# Patient Record
Sex: MALE | Race: WHITE | NOT HISPANIC OR LATINO | Employment: OTHER | URBAN - METROPOLITAN AREA
[De-identification: names, ages, dates, MRNs, and addresses within clinical notes are randomized per-mention and may not be internally consistent; named-entity substitution may affect disease eponyms.]

---

## 2023-12-04 ENCOUNTER — HOSPITAL ENCOUNTER (OUTPATIENT)
Dept: RADIOLOGY | Facility: HOSPITAL | Age: 68
Discharge: HOME | End: 2023-12-04
Attending: FAMILY MEDICINE

## 2023-12-04 DIAGNOSIS — S09.90XA HEAD INJURY: ICD-10-CM

## 2023-12-04 DIAGNOSIS — R05.9 COUGH: ICD-10-CM

## 2023-12-04 PROCEDURE — 71046 X-RAY EXAM CHEST 2 VIEWS: CPT | Mod: TC

## 2023-12-04 PROCEDURE — 70450 CT HEAD/BRAIN W/O DYE: CPT | Mod: TC

## 2024-06-01 ENCOUNTER — HOSPITAL ENCOUNTER (EMERGENCY)
Facility: HOSPITAL | Age: 69
Discharge: HOME | End: 2024-06-02
Attending: GENERAL ACUTE CARE HOSPITAL

## 2024-06-01 DIAGNOSIS — E87.6 HYPOKALEMIA: ICD-10-CM

## 2024-06-01 DIAGNOSIS — F10.220 ALCOHOL DEPENDENCE WITH UNCOMPLICATED INTOXICATION: ICD-10-CM

## 2024-06-01 DIAGNOSIS — S39.012A LUMBAR STRAIN, INITIAL ENCOUNTER: Primary | ICD-10-CM

## 2024-06-01 LAB
BASOPHILS # BLD AUTO: 0.01 X10(3)/MCL
BASOPHILS NFR BLD AUTO: 0.1 %
EOSINOPHIL # BLD AUTO: 0 X10(3)/MCL (ref 0–0.9)
EOSINOPHIL NFR BLD AUTO: 0 %
ERYTHROCYTE [DISTWIDTH] IN BLOOD BY AUTOMATED COUNT: 12 % (ref 11.5–17)
HCT VFR BLD AUTO: 44.8 % (ref 42–52)
HGB BLD-MCNC: 16.1 G/DL (ref 14–18)
IMM GRANULOCYTES # BLD AUTO: 0.07 X10(3)/MCL (ref 0–0.04)
IMM GRANULOCYTES NFR BLD AUTO: 0.6 %
LYMPHOCYTES # BLD AUTO: 0.62 X10(3)/MCL (ref 0.6–4.6)
LYMPHOCYTES NFR BLD AUTO: 5 %
MCH RBC QN AUTO: 34.3 PG (ref 27–31)
MCHC RBC AUTO-ENTMCNC: 35.9 G/DL (ref 33–36)
MCV RBC AUTO: 95.3 FL (ref 80–94)
MONOCYTES # BLD AUTO: 0.69 X10(3)/MCL (ref 0.1–1.3)
MONOCYTES NFR BLD AUTO: 5.6 %
NEUTROPHILS # BLD AUTO: 10.94 X10(3)/MCL (ref 2.1–9.2)
NEUTROPHILS NFR BLD AUTO: 88.7 %
PLATELET # BLD AUTO: 241 X10(3)/MCL (ref 130–400)
PMV BLD AUTO: 9.4 FL (ref 7.4–10.4)
RBC # BLD AUTO: 4.7 X10(6)/MCL (ref 4.7–6.1)
WBC # SPEC AUTO: 12.33 X10(3)/MCL (ref 4.5–11.5)

## 2024-06-01 PROCEDURE — 96372 THER/PROPH/DIAG INJ SC/IM: CPT | Performed by: GENERAL ACUTE CARE HOSPITAL

## 2024-06-01 PROCEDURE — 82077 ASSAY SPEC XCP UR&BREATH IA: CPT | Performed by: GENERAL ACUTE CARE HOSPITAL

## 2024-06-01 PROCEDURE — 63600175 PHARM REV CODE 636 W HCPCS: Performed by: GENERAL ACUTE CARE HOSPITAL

## 2024-06-01 PROCEDURE — 99284 EMERGENCY DEPT VISIT MOD MDM: CPT | Mod: 25

## 2024-06-01 PROCEDURE — 80053 COMPREHEN METABOLIC PANEL: CPT | Performed by: GENERAL ACUTE CARE HOSPITAL

## 2024-06-01 PROCEDURE — 85025 COMPLETE CBC W/AUTO DIFF WBC: CPT | Performed by: GENERAL ACUTE CARE HOSPITAL

## 2024-06-01 PROCEDURE — 96360 HYDRATION IV INFUSION INIT: CPT

## 2024-06-01 PROCEDURE — 25000003 PHARM REV CODE 250: Performed by: GENERAL ACUTE CARE HOSPITAL

## 2024-06-01 RX ORDER — KETOROLAC TROMETHAMINE 30 MG/ML
30 INJECTION, SOLUTION INTRAMUSCULAR; INTRAVENOUS
Status: COMPLETED | OUTPATIENT
Start: 2024-06-01 | End: 2024-06-01

## 2024-06-01 RX ORDER — CYCLOBENZAPRINE HCL 10 MG
10 TABLET ORAL
Status: COMPLETED | OUTPATIENT
Start: 2024-06-01 | End: 2024-06-01

## 2024-06-01 RX ADMIN — CYCLOBENZAPRINE 10 MG: 10 TABLET, FILM COATED ORAL at 10:06

## 2024-06-01 RX ADMIN — KETOROLAC TROMETHAMINE 30 MG: 30 INJECTION, SOLUTION INTRAMUSCULAR; INTRAVENOUS at 10:06

## 2024-06-02 VITALS
BODY MASS INDEX: 27.06 KG/M2 | HEIGHT: 70 IN | HEART RATE: 95 BPM | RESPIRATION RATE: 19 BRPM | WEIGHT: 189 LBS | OXYGEN SATURATION: 98 % | SYSTOLIC BLOOD PRESSURE: 131 MMHG | TEMPERATURE: 97 F | DIASTOLIC BLOOD PRESSURE: 62 MMHG

## 2024-06-02 LAB
ALBUMIN SERPL-MCNC: 3.2 G/DL (ref 3.4–4.8)
ALBUMIN/GLOB SERPL: 0.7 RATIO (ref 1.1–2)
ALP SERPL-CCNC: 88 UNIT/L (ref 40–150)
ALT SERPL-CCNC: 27 UNIT/L (ref 0–55)
ANION GAP SERPL CALC-SCNC: 23 MEQ/L
AST SERPL-CCNC: 58 UNIT/L (ref 5–34)
BILIRUB SERPL-MCNC: 1.2 MG/DL
BUN SERPL-MCNC: 30 MG/DL (ref 8.4–25.7)
CALCIUM SERPL-MCNC: 9.1 MG/DL (ref 8.8–10)
CHLORIDE SERPL-SCNC: 88 MMOL/L (ref 98–107)
CO2 SERPL-SCNC: 17 MMOL/L (ref 23–31)
CREAT SERPL-MCNC: 1 MG/DL (ref 0.73–1.18)
CREAT/UREA NIT SERPL: 30
ETHANOL SERPL-MCNC: 105 MG/DL
GFR SERPLBLD CREATININE-BSD FMLA CKD-EPI: >60 ML/MIN/1.73/M2
GLOBULIN SER-MCNC: 4.6 GM/DL (ref 2.4–3.5)
GLUCOSE SERPL-MCNC: 94 MG/DL (ref 82–115)
MAGNESIUM SERPL-MCNC: 2.6 MG/DL (ref 1.6–2.6)
POTASSIUM SERPL-SCNC: 2.8 MMOL/L (ref 3.5–5.1)
PROT SERPL-MCNC: 7.8 GM/DL (ref 5.8–7.6)
SODIUM SERPL-SCNC: 128 MMOL/L (ref 136–145)

## 2024-06-02 PROCEDURE — 83735 ASSAY OF MAGNESIUM: CPT | Performed by: GENERAL ACUTE CARE HOSPITAL

## 2024-06-02 PROCEDURE — 25000003 PHARM REV CODE 250: Performed by: GENERAL ACUTE CARE HOSPITAL

## 2024-06-02 RX ORDER — POTASSIUM CHLORIDE 20 MEQ/1
20 TABLET, EXTENDED RELEASE ORAL DAILY
Qty: 30 TABLET | Refills: 0 | Status: SHIPPED | OUTPATIENT
Start: 2024-06-02

## 2024-06-02 RX ORDER — POTASSIUM CHLORIDE 20 MEQ/1
40 TABLET, EXTENDED RELEASE ORAL
Status: COMPLETED | OUTPATIENT
Start: 2024-06-02 | End: 2024-06-02

## 2024-06-02 RX ADMIN — SODIUM CHLORIDE 1000 ML: 9 INJECTION, SOLUTION INTRAVENOUS at 12:06

## 2024-06-02 RX ADMIN — POTASSIUM CHLORIDE 40 MEQ: 1500 TABLET, EXTENDED RELEASE ORAL at 12:06

## 2024-06-02 NOTE — ED PROVIDER NOTES
"Encounter Date: 6/1/2024       History     Chief Complaint   Patient presents with    Back Pain     Back pain that shoots down his legs, making him unable to walk, Started today. Tried to get out of bed and felt "electrical charges" in back and butt.     Patient came in ER with chief complaints of worsening of low back pain, denies fall, admits to have a diagnosis spondylitis, patient is on chronic pain medication by PCP, admits to have a lots of alcohol tonight, no fall    The history is provided by the patient.     Review of patient's allergies indicates:  No Known Allergies  History reviewed. No pertinent past medical history.  Past Surgical History:   Procedure Laterality Date    CHOLECYSTECTOMY       No family history on file.  Social History     Tobacco Use    Smoking status: Every Day     Types: Cigarettes    Smokeless tobacco: Never   Substance Use Topics    Alcohol use: Yes    Drug use: Not Currently     Review of Systems   Musculoskeletal:  Positive for back pain.   All other systems reviewed and are negative.      Physical Exam     Initial Vitals [06/01/24 2213]   BP Pulse Resp Temp SpO2   115/75 99 19 97.3 °F (36.3 °C) 98 %      MAP       --         Physical Exam    Nursing note and vitals reviewed.  Constitutional: He appears well-developed and well-nourished.   HENT:   Right Ear: External ear normal.   Left Ear: External ear normal.   Eyes: Pupils are equal, round, and reactive to light.   Neck:   Normal range of motion.  Cardiovascular:  Normal rate and regular rhythm.           Pulmonary/Chest: Breath sounds normal.   Abdominal: Abdomen is soft. Bowel sounds are normal.   Musculoskeletal:         General: Normal range of motion.      Cervical back: Normal range of motion.      Lumbar back: Negative right straight leg raise test and negative left straight leg raise test.     Neurological: He is alert and oriented to person, place, and time. He has normal strength. No cranial nerve deficit or sensory " deficit. He displays a negative Romberg sign.   Skin: Skin is warm. Capillary refill takes 2 to 3 seconds.   Psychiatric: His behavior is normal. Thought content normal.         ED Course   Procedures  Labs Reviewed   COMPREHENSIVE METABOLIC PANEL - Abnormal; Notable for the following components:       Result Value    Sodium 128 (*)     Potassium 2.8 (*)     Chloride 88 (*)     CO2 17 (*)     Blood Urea Nitrogen 30.0 (*)     Protein Total 7.8 (*)     Albumin 3.2 (*)     Globulin 4.6 (*)     Albumin/Globulin Ratio 0.7 (*)     AST 58 (*)     All other components within normal limits   ALCOHOL,MEDICAL (ETHANOL) - Abnormal; Notable for the following components:    Ethanol Level 105.0 (*)     All other components within normal limits   CBC WITH DIFFERENTIAL - Abnormal; Notable for the following components:    WBC 12.33 (*)     MCV 95.3 (*)     MCH 34.3 (*)     Neut # 10.94 (*)     IG# 0.07 (*)     All other components within normal limits   MAGNESIUM - Normal   CBC W/ AUTO DIFFERENTIAL    Narrative:     The following orders were created for panel order CBC auto differential.  Procedure                               Abnormality         Status                     ---------                               -----------         ------                     CBC with Differential[[id number]]       Abnormal            Final result                 Please view results for these tests on the individual orders.   URINALYSIS, REFLEX TO URINE CULTURE          Imaging Results              CT Lumbar Spine Without Contrast (Preliminary result)  Result time 06/02/24 00:22:16      Preliminary result by Sin Fosetr MD (06/02/24 00:22:16)                   Narrative:    START OF REPORT:  Technique: CT of the lumbar spine was performed without intravenous contrast with direct axial as well as sagittal and coronal reconstruction images.    Comparison: None.    Clinical history: Back pain.    Findings:  Anatomy: Unremarkable.  Mineralization:  Mild osteopenia is seen of the visualized bony structures.  Bone alignment: Unremarkable with no listhesis.  Curvature: The lumbar lordosis is maintained.  Bone and bone marrow: The vertebral body heights are maintained.  Intervertebral disc spaces: Mildly decreased disc height is seen at T11- T12.  Osteophytes: There are small bridging osteophytes at T12 down through S1.  Endplate Sclerosis: No endplate sclerosis is seen.  Spinal canal: Unremarkable with no bony spinal canal stenosis identified.  Fractures: No acute lumbar spine fracture dislocation or subluxation is seen.  Orthopedic Hardware: None.  Vertebral Fusion: Chronic degenerative bony fusion is seen at S1-S2 and S3-S4. Bony spinal fusion is seen with calcification of the interspinous ligament and outer fibers of the intervertebral discs at T12-L1 through L5-S1.      Impression:  1. No acute lumbar spine fracture dislocation or subluxation is seen.  2. Bony spinal fusion is seen with calcification of the interspinous ligament and outer fibers of the intervertebral discs at T12-L1 through L5-S1. This is consistent with ankylosing spondylitis.                                         Medications   ketorolac injection 30 mg (30 mg Intramuscular Given 6/1/24 2235)   cyclobenzaprine tablet 10 mg (10 mg Oral Given 6/1/24 2235)   sodium chloride 0.9% bolus 1,000 mL 1,000 mL (0 mLs Intravenous Stopped 6/2/24 0136)   potassium chloride SA CR tablet 40 mEq (40 mEq Oral Given 6/2/24 0025)     Medical Decision Making  Patient came in ER with chief complaints of worsening of low back pain, denies fall, admits to have a diagnosis spondylitis, patient is on chronic pain medication by PCP, admits to have a lots of alcohol tonight, no fall    Vital signs are normal.    Physical exam revealed no pain/respiratory distress, patient answering questions clearly, even he intoxicated, has normal cardiac and lung exam, patient has normal muscle strength below thrill lower  extremities, has negative bilateral leg straight raising test    Amount and/or Complexity of Data Reviewed  Labs: ordered.     Details: The labs are ordered and reviewed  Radiology: ordered.     Details: CT of lumbar spine:  Impression:  1. No acute lumbar spine fracture dislocation or subluxation is seen.  2. Bony spinal fusion is seen with calcification of the interspinous ligament and outer fibers of the intervertebral discs at T12-L1 through L5-S1. This is consistent with ankylosing spondylitis.    Discussion of management or test interpretation with external provider(s): Diff diagnosis:  Lumbar strain, DJD    There is no recent fall/trauma, patient walking without walkers, there was no limping, patient known to have a low back pain, denies urinary symptoms, has negative bilateral straight leg raising test which makes diagnosis is sciatica is very low, patient received Toradol 30 mg IM and Flexeril, the labs are done and revealed signs of long term alcohol abuse ( the patient admits ETOH abuse) with hypokalemia and hyponatremia, the patient was hydrated and K was supplemented    Risk  Prescription drug management.               ED Course as of 06/02/24 0140   Sun Jun 02, 2024   0020 CBC revealed leukocytosis (WBC 12.33), with stable H&H, ETOH is 105, CMP revealed abnormality such as Na is 128, potassium 2.8, chloride 88, CO2 17 BUN 30 [IP]      ED Course User Index  [IP] Janelle Fournier MD                           Clinical Impression:  Final diagnoses:  [S39.012A] Lumbar strain, initial encounter (Primary)  [E87.6] Hypokalemia  [F10.220] Alcohol dependence with uncomplicated intoxication          ED Disposition Condition    Discharge Stable          ED Prescriptions       Medication Sig Dispense Start Date End Date Auth. Provider    potassium chloride SA (K-DUR,KLOR-CON) 20 MEQ tablet Take 1 tablet (20 mEq total) by mouth once daily. 30 tablet 6/2/2024 -- Janelle Fournier MD          Follow-up  Information       Follow up With Specialties Details Why Contact Info    Meaghan Perera MD Family Medicine In 3 days  [address]  [address]  [place] [address] [phone]               Janelle Fournier MD  06/01/24 7962       Janelle Fournier MD  06/02/24 0149